# Patient Record
Sex: MALE | Employment: FULL TIME | URBAN - METROPOLITAN AREA
[De-identification: names, ages, dates, MRNs, and addresses within clinical notes are randomized per-mention and may not be internally consistent; named-entity substitution may affect disease eponyms.]

---

## 2022-09-08 LAB
CHOLEST SERPL-MCNC: 163 MG/DL
GLUCOSE SERPL-MCNC: 84 MG/DL (ref 65–100)
HDLC SERPL-MCNC: 61 MG/DL (ref 40–60)
LDLC SERPL CALC-MCNC: 83.8 MG/DL
TRIGL SERPL-MCNC: 91 MG/DL (ref 35–150)

## 2023-04-25 ENCOUNTER — OFFICE VISIT (OUTPATIENT)
Dept: OCCUPATIONAL MEDICINE | Age: 38
End: 2023-04-25

## 2023-04-25 VITALS
SYSTOLIC BLOOD PRESSURE: 131 MMHG | BODY MASS INDEX: 24.1 KG/M2 | RESPIRATION RATE: 16 BRPM | DIASTOLIC BLOOD PRESSURE: 82 MMHG | OXYGEN SATURATION: 97 % | TEMPERATURE: 98 F | WEIGHT: 159 LBS | HEART RATE: 67 BPM | HEIGHT: 68 IN

## 2023-04-25 DIAGNOSIS — J02.9 PHARYNGITIS, UNSPECIFIED ETIOLOGY: Primary | ICD-10-CM

## 2023-04-25 DIAGNOSIS — R09.81 CONGESTION OF NASAL SINUS: ICD-10-CM

## 2023-04-25 LAB
GROUP A STREP ANTIGEN, POC: NEGATIVE
VALID INTERNAL CONTROL, POC: YES

## 2023-04-25 ASSESSMENT — ENCOUNTER SYMPTOMS
CHEST TIGHTNESS: 0
COLOR CHANGE: 0
TROUBLE SWALLOWING: 0
SINUS PRESSURE: 1
HOARSE VOICE: 1
RHINORRHEA: 0
SINUS PAIN: 0
VOICE CHANGE: 1
EYE PAIN: 0
SORE THROAT: 1
SWOLLEN GLANDS: 0
SINUS COMPLAINT: 1
COUGH: 1
EYE REDNESS: 0
SHORTNESS OF BREATH: 0
EYE ITCHING: 0

## 2023-04-25 ASSESSMENT — PATIENT HEALTH QUESTIONNAIRE - PHQ9
1. LITTLE INTEREST OR PLEASURE IN DOING THINGS: 0
SUM OF ALL RESPONSES TO PHQ QUESTIONS 1-9: 0
SUM OF ALL RESPONSES TO PHQ QUESTIONS 1-9: 0
SUM OF ALL RESPONSES TO PHQ9 QUESTIONS 1 & 2: 0
SUM OF ALL RESPONSES TO PHQ QUESTIONS 1-9: 0
2. FEELING DOWN, DEPRESSED OR HOPELESS: 0
SUM OF ALL RESPONSES TO PHQ QUESTIONS 1-9: 0

## 2023-04-25 NOTE — PROGRESS NOTES
discharge. Left eye: No discharge. Pupils: Pupils are equal, round, and reactive to light. Cardiovascular:      Rate and Rhythm: Normal rate. Heart sounds: Normal heart sounds. No murmur heard. No friction rub. No gallop. Pulmonary:      Effort: Pulmonary effort is normal. No respiratory distress. Breath sounds: Normal breath sounds. No stridor. No wheezing, rhonchi or rales. Skin:     General: Skin is warm and dry. Coloration: Skin is not jaundiced or pale. Findings: No erythema. Neurological:      General: No focal deficit present. Mental Status: He is alert. Psychiatric:         Mood and Affect: Mood normal.         Behavior: Behavior normal.         Thought Content: Thought content normal.         Judgment: Judgment normal.       ASSESSMENT and PLAN    Shadi Marshall was seen today for sinus problem. Diagnoses and all orders for this visit:    Pharyngitis, unspecified etiology  -     AMB POC RAPID STREP A    Congestion of nasal sinus    Negative result of POC strep test discussed with patient. His symptoms are most likely caused by seasonal allergies from time spent outdoors. Patient can take OTC 12-hour Sudafed as needed (take as package directed) to help relieve nasal and sinus congestion. Take in the morning to avoid sleep issues. He can take a daily Zyrtec (take as package directed) to help with daytime allergy symptoms and benadryl (as package directed) once a day at night to help with nighttime symptoms. Patient can use Flonase nasal spray to help with allergy symptoms and nasal congestion. Use once a day with two puffs in each nostril or twice a day with one puff in each nostril. Neti-pot can be used to help flush out sinuses, be sure to use sterile water or boiled and cooled water, avoid using tap water. Encourage fluid intake. Patient verbalizes understanding of above plan of care.      Patient Education:  Discussed need to avoid/limit exposure to negative...

## 2023-04-27 ENCOUNTER — TELEPHONE (OUTPATIENT)
Dept: OCCUPATIONAL MEDICINE | Age: 38
End: 2023-04-27

## 2023-04-27 NOTE — TELEPHONE ENCOUNTER
Called patient to follow up on symptoms. Left message for patient to follow up with the [facility] (employee wellness center) at [phone] if they need anything else.      BLAIR De Dios NP

## 2023-04-28 ENCOUNTER — TELEPHONE (OUTPATIENT)
Dept: OCCUPATIONAL MEDICINE | Age: 38
End: 2023-04-28

## 2023-04-28 NOTE — TELEPHONE ENCOUNTER
Patient called requesting referral for ENT. Patient reports symptoms since his appointment on 4/25/23 have not improved. He reports taking Zyrtec daily and Flonase daily as directed with minimal relief. We discussed Flonase may take up to 5 days to get the full benefits and need to do both Zyrtec and Flonase every day. Saline nasal spray prn and Benadryl QHS prn. Recommend patient continue treatment over the weekend and POC as discussed 4/25/23.  If symptoms not improving Monday to f/u and we will discuss referral if needed    Patient agrees with plan and denies questions or concerns

## 2023-05-01 ENCOUNTER — TELEPHONE (OUTPATIENT)
Dept: OCCUPATIONAL MEDICINE | Age: 38
End: 2023-05-01

## 2023-05-02 ENCOUNTER — TELEPHONE (OUTPATIENT)
Dept: OCCUPATIONAL MEDICINE | Age: 38
End: 2023-05-02

## 2023-05-02 DIAGNOSIS — J01.40 ACUTE NON-RECURRENT PANSINUSITIS: Primary | ICD-10-CM

## 2023-05-02 RX ORDER — AMOXICILLIN AND CLAVULANATE POTASSIUM 875; 125 MG/1; MG/1
1 TABLET, FILM COATED ORAL 2 TIMES DAILY
Qty: 14 TABLET | Refills: 0 | Status: SHIPPED | OUTPATIENT
Start: 2023-05-02 | End: 2023-05-09

## 2023-05-02 NOTE — TELEPHONE ENCOUNTER
Patient called the [facility] wellness center after struggling with sinus pain x 2 months despite following treatment recommended on 4/25/23 (saline sinus washes, Flonase, and sudafed). Patient is requesting a referral to ENT at this time. Patient has not had sinus pain in the past and has never needed to see an ENT before. Patient denies fevers, chills, night sweats or body aches but reports significant sinus pressure over the eye brows and right under eyes. Patient denies any significant nasal drainage. Advised patient that we can try a round of antibiotics at this point - Augmentin will be called in today and patient should take it twice a day for 7 days. Will also place a referral for [facility] (patient's health insurance is through [employer]) and he can make an appointment for a little over from a week from now and if symptoms improve greatly with antibiotics, he can cancel the appointment. If symptoms do not improve completely after taking the antibiotics, advised patient to follow up with ENT as planned. Orders Placed This Encounter    [address] [facility], Prash[facility]     Referral Priority:   Routine     Referral Type:   Eval and Treat     Referral Reason:   Specialty Services Required     Requested Specialty:   Otolaryngology     Number of Visits Requested:   1    amoxicillin-clavulanate (AUGMENTIN) 875-125 MG per tablet     Sig: Take 1 tablet by mouth 2 times daily for 7 days     Dispense:  14 tablet     Refill:  0     Antibiotics have risks and benefits, and  one risk of antibiotics is that they remove good bacteria from the gastrointestinal (GI) tract and other areas of the body, leading to stomach upset, nausea, diarrhea, or yeast infections. Taking probiotics while taking the antibiotics and for 3-4 weeks after stopping the antibiotic can help to replenish your good bacteria and reduce or prevent the side effects of antibiotics.  You can get probiotics through a capsule supplement (look brands

## 2023-05-08 ENCOUNTER — TELEPHONE (OUTPATIENT)
Dept: OCCUPATIONAL MEDICINE | Age: 38
End: 2023-05-08

## 2023-05-08 DIAGNOSIS — J01.40 ACUTE NON-RECURRENT PANSINUSITIS: Primary | ICD-10-CM

## 2023-05-08 NOTE — TELEPHONE ENCOUNTER
Called patient to follow up on sinus symptoms. Patient states that he started taking the Augmentin on Wednesday 5/3/23 and had diarrhea on the 2nd night but then this did not recur again. On Friday 5/5/23 he ran a fever and had chills, body aches, headaches, and night sweats through Sunday 5/7/23. Patient states he is feeling better today and no longer has a fever, chills, body aches or headaches but did sweat some last night. Patient states that he did not test for COVID-19 over the weekend but does have some home tests. 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 Patient states that overall his sinus symptoms are better - he hasn't had a runny nose or sinus pressure for some time now. 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 Patient has two more days of taking the Augmentin but is nervous to keep taking it due to his previous symptoms over the weekend. Advised patient that it sounds like he may have picked up a different virus, such as COVID-19 or influenza over the weekend and that was more likely the cause of hs fever than the Augmentin. Advised trying to continue the Augmentin today and see how he feels. Will call the patient around 0700 tomorrow morning and see how he's doing then. Patient was able to schedule his appointment with [facility] for 5/25/23. Advised patient to follow up with the [facility] (employee wellness center) at [phone] if they need anything else.      BLAIR Burr NP

## 2023-05-09 ENCOUNTER — TELEPHONE (OUTPATIENT)
Dept: OCCUPATIONAL MEDICINE | Age: 38
End: 2023-05-09

## 2023-05-09 NOTE — TELEPHONE ENCOUNTER
Called patient to follow up on symptoms. Patient states that he felt a lot better yesterday even with taking the antibiotic. Patient thinks that he did have some other virus causing a fever and chills over the weekend. Patient is still not aware of any sick contacts at work or home. Patient will finish the Augmentin today - patient thinks it has helped his sinus symptoms, but he will see how things go and decide if he should still follow up with [facility] on 5/25/23. Advised patient to follow up with the  [facility] ([facility]) at [phone] if they need anything else.      BLAIR Fuller - NP

## 2023-05-25 ENCOUNTER — OFFICE VISIT (OUTPATIENT)
Dept: ENT CLINIC | Age: 38
End: 2023-05-25
Payer: COMMERCIAL

## 2023-05-25 VITALS — HEIGHT: 68 IN | WEIGHT: 154.6 LBS | OXYGEN SATURATION: 96 % | BODY MASS INDEX: 23.43 KG/M2 | HEART RATE: 65 BPM

## 2023-05-25 DIAGNOSIS — J30.9 ALLERGIC RHINITIS, UNSPECIFIED SEASONALITY, UNSPECIFIED TRIGGER: Primary | ICD-10-CM

## 2023-05-25 DIAGNOSIS — R05.3 CHRONIC COUGH: ICD-10-CM

## 2023-05-25 PROCEDURE — 31575 DIAGNOSTIC LARYNGOSCOPY: CPT | Performed by: STUDENT IN AN ORGANIZED HEALTH CARE EDUCATION/TRAINING PROGRAM

## 2023-05-25 PROCEDURE — 99243 OFF/OP CNSLTJ NEW/EST LOW 30: CPT | Performed by: STUDENT IN AN ORGANIZED HEALTH CARE EDUCATION/TRAINING PROGRAM

## 2023-05-25 ASSESSMENT — ENCOUNTER SYMPTOMS
VOICE CHANGE: 1
SORE THROAT: 1
EYE REDNESS: 0
COUGH: 1
SINUS PRESSURE: 0
VOMITING: 0
EYE ITCHING: 0
SHORTNESS OF BREATH: 0

## 2023-05-25 NOTE — PROGRESS NOTES
[facility] Office Note    Patient: Adelita Taylor  MRN: [id number]  : 1985  Gender:  male  Vital Signs: Pulse 65   Ht 5' 8\" (1.727 m)   Wt 154 lb 9.6 oz (70.1 kg)   SpO2 96%   BMI 23.51 kg/m²   Date: 2023    CC:   Chief Complaint   Patient presents with    New Patient     Sore throat, vocal change, & cough       HPI:  Adelita Taylor is a 30 y.o. male who has a h/o AR, nasal congestion, throat pain, and hoarseness. He has to use his voice for his job as a . He feels close to his baseline. He also has a cough. PND improved. He denies GERD. He uses nasal saline spray. He was treated with a course of Augmentin about a month ago. Past Medical History, Past Surgical History, Family history, Social History, and Medications were all reviewed with the patient today and updated as necessary. No Known Allergies  There is no problem list on file for this patient. No current outpatient medications on file. No current facility-administered medications for this visit. Past Medical History:   Diagnosis Date    Pain     from injury received in      Social History     Tobacco Use    Smoking status: Never    Smokeless tobacco: Never   Substance Use Topics    Alcohol use: No     Past Surgical History:   Procedure Laterality Date    WISDOM TOOTH EXTRACTION  2017     History reviewed. No pertinent family history. ROS:    Review of Systems   Constitutional:  Negative for activity change and appetite change. HENT:  Positive for sore throat and voice change. Negative for congestion, ear discharge, ear pain and sinus pressure. Eyes:  Negative for redness and itching. Respiratory:  Positive for cough. Negative for shortness of breath. Cardiovascular:  Negative for chest pain. Gastrointestinal:  Negative for vomiting. Endocrine: Negative for cold intolerance and heat intolerance. Allergic/Immunologic: Negative for environmental allergies.    Neurological:  Negative

## 2025-05-29 ENCOUNTER — TELEPHONE (OUTPATIENT)
Dept: ORTHOPEDIC SURGERY | Age: 40
End: 2025-05-29

## 2025-05-29 NOTE — TELEPHONE ENCOUNTER
Displaced avulsion fracture of tuberosity of unspecified calcaneus, initial encounter for closed fracture     SOONER APPT. FOR THIS?

## 2025-06-04 ENCOUNTER — OFFICE VISIT (OUTPATIENT)
Dept: ORTHOPEDIC SURGERY | Age: 40
End: 2025-06-04
Payer: COMMERCIAL

## 2025-06-04 DIAGNOSIS — S82.62XA CLOSED DISPLACED FRACTURE OF LATERAL MALLEOLUS OF LEFT FIBULA, INITIAL ENCOUNTER: Primary | ICD-10-CM

## 2025-06-04 PROCEDURE — 99204 OFFICE O/P NEW MOD 45 MIN: CPT | Performed by: ORTHOPAEDIC SURGERY

## 2025-06-04 PROCEDURE — L4361 PNEUMA/VAC WALK BOOT PRE OTS: HCPCS | Performed by: ORTHOPAEDIC SURGERY

## 2025-06-04 PROCEDURE — M5017 MISC EVENUP: HCPCS | Performed by: ORTHOPAEDIC SURGERY

## 2025-06-04 PROCEDURE — M5002 MISC BOOT SOCK: HCPCS | Performed by: ORTHOPAEDIC SURGERY

## 2025-06-04 NOTE — PROGRESS NOTES
Patient was given two extra Boot Socks for the left foot.  Patient was fitted and instructed on an Even Up for the right foot.  The patient was prescribed a walker boot for the patient's left foot. The patient wears a size 10 shoe and I fitted them with a L size boot. The patient was fitted and instructed on the use of prescribed walker boot by a Registered Orthopedic Technician. I explained how to fit themselves and that the plastic flexible piece should always be on the front of the boot and secured by the Velcro straps on top. The air bladder in the boot was adjusted according to proper fit and comfort. The patient walked a short distance and acknowledged satisfaction with current fit. I also explained that they need a heel lift or a higher heeled shoe for the uninvolved LE to help normalize gait and avoid excessive low back stress/strain due to leg length inequality created from walker boot.  Patient read and signed documenting they understand and agree to [facility]'s current DME return policy.

## 2025-06-04 NOTE — PROGRESS NOTES
Name: Farhan Calderon  YOB: 1985  Gender: male  MRN: [id number]    Summary:   Left distal fibular avulsion fracture       CC: New Patient (Left foot/ankle xrays in PACS )       HPI: Farhan Calderon is a 39 y.o. male who presents with New Patient (Left foot/ankle xrays in PACS )  .  This patient presents the office today about 1 week out from injury where he sustained while sliding back into second base in a softball game.  He had acute onset of pain the lateral aspect of his left ankle.  He also was seen in urgent care and diagnosed with a potential fracture and placed in a walker boot.  Today for 3 to follow-up.    History was obtained by Patient     ROS/Meds/PSH/PMH/FH/SH: I personally reviewed the patients standard intake form.  Below are the pertinents    Tobacco:  reports that he has never smoked. He has never used smokeless tobacco.  Diabetes: None      Physical Examination:  Exam of the left lower extremity shows tenderness palpation of the tip of the fibula.  There is no evidence of a peroneal tendon subluxation.  He has palpable pulses and intact sensation there is some bruising in the dorsal aspect laterally of the foot without any sign of tenderness.      Imaging:   I independently interpreted XR ordered by a different physician, taken from an outside facility of the left ankle which shows a distal fibular avulsion fracture          Assessment:   Left distal fibular avulsion fracture    Treatment Plan:   4 This is a chronic illness/condition with exacerbation and progression  Treatment at this time: Bracing: Placed in: 3D boot  Studies ordered: Ankle XR needed @ Next Visit    Weight-bearing status: WBAT        Return to work/work restrictions: none  No medications given    He is placed in the walker boot today weightbearing as tolerated to protect the fracture.  He will return in 4 weeks for reevaluation and x-ray.  We did discuss the small risk of ankle instability in the long-term

## 2025-07-02 ENCOUNTER — OFFICE VISIT (OUTPATIENT)
Dept: ORTHOPEDIC SURGERY | Age: 40
End: 2025-07-02
Payer: COMMERCIAL

## 2025-07-02 DIAGNOSIS — S82.62XA CLOSED DISPLACED FRACTURE OF LATERAL MALLEOLUS OF LEFT FIBULA, INITIAL ENCOUNTER: Primary | ICD-10-CM

## 2025-07-02 PROCEDURE — 99213 OFFICE O/P EST LOW 20 MIN: CPT | Performed by: ORTHOPAEDIC SURGERY

## 2025-07-02 NOTE — PROGRESS NOTES
Name: Farhan Calderon  YOB: 1985  Gender: male  MRN: [id number]    Summary:   Left distal fibular avulsion fracture with Achilles tendon       CC: Follow-up (Closed displaced fracture of lateral malleolus of left fibula/Xrays obtained in office )       HPI: Farhan Calderon is a 39 y.o. male who presents with Follow-up (Closed displaced fracture of lateral malleolus of left fibula/Xrays obtained in office )  .  This patient presents to the office today with improvements in his left ankle pain.  He been in a walker boot now for several weeks.    History was obtained by Patient     ROS/Meds/PSH/PMH/FH/SH: I personally reviewed the patients standard intake form.  Below are the pertinents    Tobacco:  reports that he has never smoked. He has never used smokeless tobacco.  Diabetes: None      Physical Examination:    Exam of the left lower extremity shows less swelling and tenderness over the distal fibula.  Has good stability talar tilt testing and intra drawer testing.  Mild tenderness of the Achilles insertion point.    Imaging:   Interpretation of imaging  Left ankle XR: AP, Lateral, Oblique views     ICD-10-CM    1. Closed displaced fracture of lateral malleolus of left fibula, initial encounter  S82.62XA CANCELED: XR ANKLE RIGHT (MIN 3 VIEWS)         Report: AP, lateral, oblique x-ray of the left ankle demonstrates healing distal fibula fracture    Impression: Healing distal fibula   SARAH PRADO III, MD           Assessment:   Left healing distal fibular avulsion fracture    Treatment Plan:   4 This is a chronic illness/condition with exacerbation and progression  Treatment at this time: Physical Therapy and ASO - lace up Ankle  Studies ordered: NO XR needed @ Next Visit    Weight-bearing status: WBAT        Return to work/work restrictions: none  No medications given    He will transit into an off-the-shelf brace and start the home exercise program provided today.  Discussed the provide